# Patient Record
Sex: FEMALE | Race: WHITE | NOT HISPANIC OR LATINO | Employment: OTHER | URBAN - METROPOLITAN AREA
[De-identification: names, ages, dates, MRNs, and addresses within clinical notes are randomized per-mention and may not be internally consistent; named-entity substitution may affect disease eponyms.]

---

## 2022-06-24 ENCOUNTER — HOSPITAL ENCOUNTER (INPATIENT)
Facility: HOSPITAL | Age: 82
LOS: 1 days | Discharge: HOME | End: 2022-06-25
Attending: INTERNAL MEDICINE | Admitting: INTERNAL MEDICINE

## 2022-06-24 ENCOUNTER — APPOINTMENT (OUTPATIENT)
Dept: CARDIOLOGY | Facility: HOSPITAL | Age: 82
End: 2022-06-24

## 2022-06-24 DIAGNOSIS — I25.118 CORONARY ARTERY DISEASE OF NATIVE ARTERY OF NATIVE HEART WITH STABLE ANGINA PECTORIS: Primary | ICD-10-CM

## 2022-06-24 LAB
ANION GAP SERPL CALCULATED.3IONS-SCNC: 9 MMOL/L (ref 5–15)
BH CV ECHO MEAS - AO MAX PG: 7.6 MMHG
BH CV ECHO MEAS - AO MEAN PG: 4 MMHG
BH CV ECHO MEAS - AO ROOT DIAM: 3.1 CM
BH CV ECHO MEAS - AO V2 MAX: 138 CM/SEC
BH CV ECHO MEAS - AO V2 VTI: 26 CM
BH CV ECHO MEAS - AVA(I,D): 1.93 CM2
BH CV ECHO MEAS - EDV(CUBED): 54.9 ML
BH CV ECHO MEAS - EDV(MOD-SP2): 85.2 ML
BH CV ECHO MEAS - EDV(MOD-SP4): 105 ML
BH CV ECHO MEAS - EF(MOD-BP): 48.7 %
BH CV ECHO MEAS - EF(MOD-SP2): 39.6 %
BH CV ECHO MEAS - EF(MOD-SP4): 56.4 %
BH CV ECHO MEAS - ESV(CUBED): 15.6 ML
BH CV ECHO MEAS - ESV(MOD-SP2): 51.5 ML
BH CV ECHO MEAS - ESV(MOD-SP4): 45.8 ML
BH CV ECHO MEAS - FS: 34.2 %
BH CV ECHO MEAS - IVS/LVPW: 1 CM
BH CV ECHO MEAS - IVSD: 1.2 CM
BH CV ECHO MEAS - LA DIMENSION: 2.6 CM
BH CV ECHO MEAS - LAT PEAK E' VEL: 5.9 CM/SEC
BH CV ECHO MEAS - LV MASS(C)D: 153.2 GRAMS
BH CV ECHO MEAS - LV MAX PG: 3.9 MMHG
BH CV ECHO MEAS - LV MEAN PG: 2 MMHG
BH CV ECHO MEAS - LV V1 MAX: 98.2 CM/SEC
BH CV ECHO MEAS - LV V1 VTI: 17.7 CM
BH CV ECHO MEAS - LVIDD: 3.8 CM
BH CV ECHO MEAS - LVIDS: 2.5 CM
BH CV ECHO MEAS - LVOT AREA: 2.8 CM2
BH CV ECHO MEAS - LVOT DIAM: 1.9 CM
BH CV ECHO MEAS - LVPWD: 1.2 CM
BH CV ECHO MEAS - MV A MAX VEL: 85 CM/SEC
BH CV ECHO MEAS - MV DEC TIME: 0.22 MSEC
BH CV ECHO MEAS - MV E MAX VEL: 76.8 CM/SEC
BH CV ECHO MEAS - MV E/A: 0.9
BH CV ECHO MEAS - MV MAX PG: 3.9 MMHG
BH CV ECHO MEAS - MV MEAN PG: 2 MMHG
BH CV ECHO MEAS - MV V2 VTI: 25.8 CM
BH CV ECHO MEAS - MVA(VTI): 1.95 CM2
BH CV ECHO MEAS - RAP SYSTOLE: 3 MMHG
BH CV ECHO MEAS - RVSP: 27 MMHG
BH CV ECHO MEAS - SV(LVOT): 50.2 ML
BH CV ECHO MEAS - SV(MOD-SP2): 33.7 ML
BH CV ECHO MEAS - SV(MOD-SP4): 59.2 ML
BH CV ECHO MEAS - TR MAX PG: 24.3 MMHG
BH CV ECHO MEAS - TR MAX VEL: 246.5 CM/SEC
BH CV XLRA - RV BASE: 3.2 CM
BH CV XLRA - RV LENGTH: 5.5 CM
BH CV XLRA - RV MID: 3 CM
BH CV XLRA - TDI S': 13.9 CM/SEC
BUN SERPL-MCNC: 33 MG/DL (ref 8–23)
BUN/CREAT SERPL: 23.2 (ref 7–25)
CALCIUM SPEC-SCNC: 10.2 MG/DL (ref 8.6–10.5)
CHLORIDE SERPL-SCNC: 95 MMOL/L (ref 98–107)
CO2 SERPL-SCNC: 27 MMOL/L (ref 22–29)
CREAT SERPL-MCNC: 1.42 MG/DL (ref 0.57–1)
DEPRECATED RDW RBC AUTO: 61.4 FL (ref 37–54)
EGFRCR SERPLBLD CKD-EPI 2021: 37 ML/MIN/1.73
ERYTHROCYTE [DISTWIDTH] IN BLOOD BY AUTOMATED COUNT: 18.4 % (ref 12.3–15.4)
GLUCOSE SERPL-MCNC: 129 MG/DL (ref 65–99)
HCT VFR BLD AUTO: 37.8 % (ref 34–46.6)
HGB BLD-MCNC: 11.9 G/DL (ref 12–15.9)
MAXIMAL PREDICTED HEART RATE: 138 BPM
MCH RBC QN AUTO: 28.9 PG (ref 26.6–33)
MCHC RBC AUTO-ENTMCNC: 31.5 G/DL (ref 31.5–35.7)
MCV RBC AUTO: 91.7 FL (ref 79–97)
PLATELET # BLD AUTO: 319 10*3/MM3 (ref 140–450)
PMV BLD AUTO: 9.6 FL (ref 6–12)
POTASSIUM SERPL-SCNC: 4.5 MMOL/L (ref 3.5–5.2)
RBC # BLD AUTO: 4.12 10*6/MM3 (ref 3.77–5.28)
SARS-COV-2 RDRP RESP QL NAA+PROBE: NORMAL
SODIUM SERPL-SCNC: 131 MMOL/L (ref 136–145)
STRESS TARGET HR: 117 BPM
WBC NRBC COR # BLD: 9.85 10*3/MM3 (ref 3.4–10.8)

## 2022-06-24 PROCEDURE — C1769 GUIDE WIRE: HCPCS | Performed by: INTERNAL MEDICINE

## 2022-06-24 PROCEDURE — 93458 L HRT ARTERY/VENTRICLE ANGIO: CPT | Performed by: INTERNAL MEDICINE

## 2022-06-24 PROCEDURE — 25010000002 MIDAZOLAM PER 1 MG: Performed by: INTERNAL MEDICINE

## 2022-06-24 PROCEDURE — 93005 ELECTROCARDIOGRAM TRACING: CPT | Performed by: INTERNAL MEDICINE

## 2022-06-24 PROCEDURE — C1725 CATH, TRANSLUMIN NON-LASER: HCPCS | Performed by: INTERNAL MEDICINE

## 2022-06-24 PROCEDURE — 25010000002 HEPARIN (PORCINE) PER 1000 UNITS: Performed by: INTERNAL MEDICINE

## 2022-06-24 PROCEDURE — 93571 IV DOP VEL&/PRESS C FLO 1ST: CPT | Performed by: INTERNAL MEDICINE

## 2022-06-24 PROCEDURE — C1894 INTRO/SHEATH, NON-LASER: HCPCS | Performed by: INTERNAL MEDICINE

## 2022-06-24 PROCEDURE — 80048 BASIC METABOLIC PNL TOTAL CA: CPT | Performed by: INTERNAL MEDICINE

## 2022-06-24 PROCEDURE — B2111ZZ FLUOROSCOPY OF MULTIPLE CORONARY ARTERIES USING LOW OSMOLAR CONTRAST: ICD-10-PCS | Performed by: INTERNAL MEDICINE

## 2022-06-24 PROCEDURE — 93306 TTE W/DOPPLER COMPLETE: CPT

## 2022-06-24 PROCEDURE — C1887 CATHETER, GUIDING: HCPCS | Performed by: INTERNAL MEDICINE

## 2022-06-24 PROCEDURE — 4A023N7 MEASUREMENT OF CARDIAC SAMPLING AND PRESSURE, LEFT HEART, PERCUTANEOUS APPROACH: ICD-10-PCS | Performed by: INTERNAL MEDICINE

## 2022-06-24 PROCEDURE — 93306 TTE W/DOPPLER COMPLETE: CPT | Performed by: INTERNAL MEDICINE

## 2022-06-24 PROCEDURE — 87635 SARS-COV-2 COVID-19 AMP PRB: CPT | Performed by: NURSE PRACTITIONER

## 2022-06-24 PROCEDURE — 99223 1ST HOSP IP/OBS HIGH 75: CPT | Performed by: INTERNAL MEDICINE

## 2022-06-24 PROCEDURE — 0 IOPAMIDOL PER 1 ML: Performed by: INTERNAL MEDICINE

## 2022-06-24 PROCEDURE — 92920 PRQ TRLUML C ANGIOP 1ART&/BR: CPT | Performed by: INTERNAL MEDICINE

## 2022-06-24 PROCEDURE — B2151ZZ FLUOROSCOPY OF LEFT HEART USING LOW OSMOLAR CONTRAST: ICD-10-PCS | Performed by: INTERNAL MEDICINE

## 2022-06-24 PROCEDURE — 02703ZZ DILATION OF CORONARY ARTERY, ONE ARTERY, PERCUTANEOUS APPROACH: ICD-10-PCS | Performed by: INTERNAL MEDICINE

## 2022-06-24 PROCEDURE — 85347 COAGULATION TIME ACTIVATED: CPT

## 2022-06-24 PROCEDURE — 85027 COMPLETE CBC AUTOMATED: CPT | Performed by: INTERNAL MEDICINE

## 2022-06-24 PROCEDURE — 25010000002 FENTANYL CITRATE (PF) 50 MCG/ML SOLUTION: Performed by: INTERNAL MEDICINE

## 2022-06-24 PROCEDURE — 25010000002 PHENYLEPHRINE 10 MG/ML SOLUTION: Performed by: INTERNAL MEDICINE

## 2022-06-24 RX ORDER — SODIUM CHLORIDE 0.9 % (FLUSH) 0.9 %
3-10 SYRINGE (ML) INJECTION AS NEEDED
Status: DISCONTINUED | OUTPATIENT
Start: 2022-06-24 | End: 2022-06-24 | Stop reason: HOSPADM

## 2022-06-24 RX ORDER — ACETAMINOPHEN 325 MG/1
650 TABLET ORAL EVERY 6 HOURS PRN
Status: DISCONTINUED | OUTPATIENT
Start: 2022-06-24 | End: 2022-06-25 | Stop reason: HOSPADM

## 2022-06-24 RX ORDER — CLOPIDOGREL BISULFATE 75 MG/1
75 TABLET ORAL DAILY
Status: DISCONTINUED | OUTPATIENT
Start: 2022-06-25 | End: 2022-06-25 | Stop reason: HOSPADM

## 2022-06-24 RX ORDER — FUROSEMIDE 40 MG/1
40 TABLET ORAL 2 TIMES DAILY
COMMUNITY

## 2022-06-24 RX ORDER — CLOPIDOGREL BISULFATE 75 MG/1
TABLET ORAL AS NEEDED
Status: DISCONTINUED | OUTPATIENT
Start: 2022-06-24 | End: 2022-06-24 | Stop reason: HOSPADM

## 2022-06-24 RX ORDER — ASPIRIN 81 MG/1
81 TABLET ORAL DAILY
Status: DISCONTINUED | OUTPATIENT
Start: 2022-06-24 | End: 2022-06-24

## 2022-06-24 RX ORDER — AMIODARONE HYDROCHLORIDE 200 MG/1
200 TABLET ORAL 2 TIMES DAILY
Status: ON HOLD | COMMUNITY
End: 2022-06-25 | Stop reason: SDUPTHER

## 2022-06-24 RX ORDER — SOTALOL HYDROCHLORIDE 80 MG/1
80 TABLET ORAL DAILY
COMMUNITY
End: 2022-06-25 | Stop reason: HOSPADM

## 2022-06-24 RX ORDER — ACETAMINOPHEN 325 MG/1
650 TABLET ORAL EVERY 6 HOURS PRN
COMMUNITY
End: 2022-06-25 | Stop reason: HOSPADM

## 2022-06-24 RX ORDER — FAMOTIDINE 20 MG/1
20 TABLET, FILM COATED ORAL DAILY
COMMUNITY
End: 2022-06-25 | Stop reason: HOSPADM

## 2022-06-24 RX ORDER — CELECOXIB 200 MG/1
200 CAPSULE ORAL DAILY
COMMUNITY
End: 2022-06-25 | Stop reason: HOSPADM

## 2022-06-24 RX ORDER — SODIUM CHLORIDE 9 MG/ML
3 INJECTION, SOLUTION INTRAVENOUS CONTINUOUS
Status: DISCONTINUED | OUTPATIENT
Start: 2022-06-24 | End: 2022-06-24

## 2022-06-24 RX ORDER — LIDOCAINE HYDROCHLORIDE 10 MG/ML
INJECTION, SOLUTION EPIDURAL; INFILTRATION; INTRACAUDAL; PERINEURAL AS NEEDED
Status: DISCONTINUED | OUTPATIENT
Start: 2022-06-24 | End: 2022-06-24 | Stop reason: HOSPADM

## 2022-06-24 RX ORDER — ERGOCALCIFEROL 1.25 MG/1
50000 CAPSULE ORAL
COMMUNITY

## 2022-06-24 RX ORDER — OMEPRAZOLE 40 MG/1
40 CAPSULE, DELAYED RELEASE ORAL DAILY
COMMUNITY

## 2022-06-24 RX ORDER — DONEPEZIL HYDROCHLORIDE 10 MG/1
10 TABLET, FILM COATED ORAL NIGHTLY
COMMUNITY

## 2022-06-24 RX ORDER — FLUOXETINE HYDROCHLORIDE 20 MG/1
20 CAPSULE ORAL DAILY
Status: DISCONTINUED | OUTPATIENT
Start: 2022-06-24 | End: 2022-06-25 | Stop reason: HOSPADM

## 2022-06-24 RX ORDER — SODIUM CHLORIDE 9 MG/ML
1 INJECTION, SOLUTION INTRAVENOUS CONTINUOUS
Status: ACTIVE | OUTPATIENT
Start: 2022-06-24 | End: 2022-06-24

## 2022-06-24 RX ORDER — AMIODARONE HYDROCHLORIDE 200 MG/1
200 TABLET ORAL 2 TIMES DAILY
Status: DISCONTINUED | OUTPATIENT
Start: 2022-06-24 | End: 2022-06-25 | Stop reason: HOSPADM

## 2022-06-24 RX ORDER — FENTANYL CITRATE 50 UG/ML
INJECTION, SOLUTION INTRAMUSCULAR; INTRAVENOUS AS NEEDED
Status: DISCONTINUED | OUTPATIENT
Start: 2022-06-24 | End: 2022-06-24 | Stop reason: HOSPADM

## 2022-06-24 RX ORDER — LOSARTAN POTASSIUM 25 MG/1
25 TABLET ORAL 2 TIMES DAILY
COMMUNITY

## 2022-06-24 RX ORDER — DILTIAZEM HYDROCHLORIDE 180 MG/1
180 CAPSULE, COATED, EXTENDED RELEASE ORAL DAILY
Status: DISCONTINUED | OUTPATIENT
Start: 2022-06-25 | End: 2022-06-25 | Stop reason: HOSPADM

## 2022-06-24 RX ORDER — PHENYLEPHRINE HYDROCHLORIDE 10 MG/ML
INJECTION INTRAVENOUS AS NEEDED
Status: DISCONTINUED | OUTPATIENT
Start: 2022-06-24 | End: 2022-06-24 | Stop reason: HOSPADM

## 2022-06-24 RX ORDER — ALBUTEROL SULFATE 2.5 MG/3ML
2.5 SOLUTION RESPIRATORY (INHALATION) EVERY 4 HOURS PRN
COMMUNITY

## 2022-06-24 RX ORDER — DONEPEZIL HYDROCHLORIDE 10 MG/1
10 TABLET, FILM COATED ORAL NIGHTLY
Status: DISCONTINUED | OUTPATIENT
Start: 2022-06-24 | End: 2022-06-25 | Stop reason: HOSPADM

## 2022-06-24 RX ORDER — DOCUSATE SODIUM 100 MG/1
100 CAPSULE, LIQUID FILLED ORAL 2 TIMES DAILY
COMMUNITY

## 2022-06-24 RX ORDER — PANTOPRAZOLE SODIUM 40 MG/1
40 TABLET, DELAYED RELEASE ORAL DAILY
COMMUNITY
End: 2022-06-25 | Stop reason: HOSPADM

## 2022-06-24 RX ORDER — ISOSORBIDE MONONITRATE 60 MG/1
60 TABLET, EXTENDED RELEASE ORAL DAILY
COMMUNITY

## 2022-06-24 RX ORDER — FLUOXETINE HYDROCHLORIDE 20 MG/1
20 CAPSULE ORAL DAILY
COMMUNITY

## 2022-06-24 RX ORDER — ALBUTEROL SULFATE 2.5 MG/3ML
2.5 SOLUTION RESPIRATORY (INHALATION) EVERY 4 HOURS PRN
Status: DISCONTINUED | OUTPATIENT
Start: 2022-06-24 | End: 2022-06-25 | Stop reason: HOSPADM

## 2022-06-24 RX ORDER — MONTELUKAST SODIUM 10 MG/1
10 TABLET ORAL NIGHTLY
COMMUNITY

## 2022-06-24 RX ORDER — HEPARIN SODIUM 1000 [USP'U]/ML
INJECTION, SOLUTION INTRAVENOUS; SUBCUTANEOUS AS NEEDED
Status: DISCONTINUED | OUTPATIENT
Start: 2022-06-24 | End: 2022-06-24 | Stop reason: HOSPADM

## 2022-06-24 RX ORDER — POTASSIUM CHLORIDE 750 MG/1
20 TABLET, FILM COATED, EXTENDED RELEASE ORAL 2 TIMES DAILY WITH MEALS
COMMUNITY

## 2022-06-24 RX ORDER — ASPIRIN 81 MG/1
81 TABLET ORAL DAILY
COMMUNITY
End: 2022-06-25 | Stop reason: HOSPADM

## 2022-06-24 RX ORDER — DILTIAZEM HYDROCHLORIDE 180 MG/1
180 CAPSULE, COATED, EXTENDED RELEASE ORAL DAILY
COMMUNITY

## 2022-06-24 RX ORDER — ONDANSETRON 4 MG/1
4 TABLET, ORALLY DISINTEGRATING ORAL EVERY 6 HOURS PRN
COMMUNITY

## 2022-06-24 RX ORDER — SODIUM CHLORIDE 0.9 % (FLUSH) 0.9 %
10 SYRINGE (ML) INJECTION AS NEEDED
Status: DISCONTINUED | OUTPATIENT
Start: 2022-06-24 | End: 2022-06-25 | Stop reason: HOSPADM

## 2022-06-24 RX ORDER — SODIUM CHLORIDE 0.9 % (FLUSH) 0.9 %
10 SYRINGE (ML) INJECTION EVERY 12 HOURS SCHEDULED
Status: DISCONTINUED | OUTPATIENT
Start: 2022-06-24 | End: 2022-06-25 | Stop reason: HOSPADM

## 2022-06-24 RX ORDER — PREDNISONE 10 MG/1
10 TABLET ORAL DAILY
COMMUNITY

## 2022-06-24 RX ORDER — PRIMIDONE 50 MG/1
50 TABLET ORAL 2 TIMES DAILY
COMMUNITY

## 2022-06-24 RX ORDER — MONTELUKAST SODIUM 4 MG/1
1 TABLET, CHEWABLE ORAL 2 TIMES DAILY
COMMUNITY

## 2022-06-24 RX ORDER — ONDANSETRON 2 MG/ML
4 INJECTION INTRAMUSCULAR; INTRAVENOUS EVERY 6 HOURS PRN
Status: DISCONTINUED | OUTPATIENT
Start: 2022-06-24 | End: 2022-06-25 | Stop reason: HOSPADM

## 2022-06-24 RX ORDER — FAMOTIDINE 20 MG/1
20 TABLET, FILM COATED ORAL DAILY
Status: DISCONTINUED | OUTPATIENT
Start: 2022-06-24 | End: 2022-06-25 | Stop reason: HOSPADM

## 2022-06-24 RX ORDER — ONDANSETRON 4 MG/1
4 TABLET, FILM COATED ORAL EVERY 6 HOURS PRN
Status: DISCONTINUED | OUTPATIENT
Start: 2022-06-24 | End: 2022-06-25 | Stop reason: HOSPADM

## 2022-06-24 RX ORDER — LOSARTAN POTASSIUM 25 MG/1
25 TABLET ORAL 2 TIMES DAILY
Status: DISCONTINUED | OUTPATIENT
Start: 2022-06-24 | End: 2022-06-25 | Stop reason: HOSPADM

## 2022-06-24 RX ORDER — MIDAZOLAM HYDROCHLORIDE 1 MG/ML
INJECTION INTRAMUSCULAR; INTRAVENOUS AS NEEDED
Status: DISCONTINUED | OUTPATIENT
Start: 2022-06-24 | End: 2022-06-24 | Stop reason: HOSPADM

## 2022-06-24 RX ORDER — RALOXIFENE HYDROCHLORIDE 60 MG/1
60 TABLET, FILM COATED ORAL DAILY
COMMUNITY

## 2022-06-24 RX ORDER — SODIUM CHLORIDE 0.9 % (FLUSH) 0.9 %
3 SYRINGE (ML) INJECTION EVERY 12 HOURS SCHEDULED
Status: DISCONTINUED | OUTPATIENT
Start: 2022-06-24 | End: 2022-06-24 | Stop reason: HOSPADM

## 2022-06-24 RX ORDER — SUCRALFATE 1 G/1
1 TABLET ORAL 4 TIMES DAILY
COMMUNITY

## 2022-06-24 RX ORDER — FOLIC ACID 1 MG/1
1 TABLET ORAL DAILY
COMMUNITY

## 2022-06-24 RX ORDER — LANOLIN ALCOHOL/MO/W.PET/CERES
1 CREAM (GRAM) TOPICAL DAILY
Status: ON HOLD | COMMUNITY
End: 2022-06-24

## 2022-06-24 RX ADMIN — SODIUM CHLORIDE 3 ML/KG/HR: 9 INJECTION, SOLUTION INTRAVENOUS at 14:43

## 2022-06-24 RX ADMIN — LOSARTAN POTASSIUM 25 MG: 25 TABLET, FILM COATED ORAL at 21:05

## 2022-06-24 RX ADMIN — Medication 10 ML: at 21:05

## 2022-06-24 RX ADMIN — FAMOTIDINE 20 MG: 20 TABLET ORAL at 17:39

## 2022-06-24 RX ADMIN — AMIODARONE HYDROCHLORIDE 200 MG: 200 TABLET ORAL at 21:04

## 2022-06-24 RX ADMIN — DONEPEZIL HYDROCHLORIDE 10 MG: 10 TABLET ORAL at 21:05

## 2022-06-25 VITALS
DIASTOLIC BLOOD PRESSURE: 68 MMHG | TEMPERATURE: 98 F | SYSTOLIC BLOOD PRESSURE: 120 MMHG | RESPIRATION RATE: 16 BRPM | HEIGHT: 64 IN | OXYGEN SATURATION: 94 % | BODY MASS INDEX: 27.31 KG/M2 | WEIGHT: 160 LBS | HEART RATE: 70 BPM

## 2022-06-25 LAB
ANION GAP SERPL CALCULATED.3IONS-SCNC: 9 MMOL/L (ref 5–15)
BUN SERPL-MCNC: 34 MG/DL (ref 8–23)
BUN/CREAT SERPL: 25.8 (ref 7–25)
CALCIUM SPEC-SCNC: 9.7 MG/DL (ref 8.6–10.5)
CHLORIDE SERPL-SCNC: 100 MMOL/L (ref 98–107)
CHOLEST SERPL-MCNC: 161 MG/DL (ref 0–200)
CO2 SERPL-SCNC: 26 MMOL/L (ref 22–29)
CREAT SERPL-MCNC: 1.32 MG/DL (ref 0.57–1)
DEPRECATED RDW RBC AUTO: 63.5 FL (ref 37–54)
EGFRCR SERPLBLD CKD-EPI 2021: 40.4 ML/MIN/1.73
ERYTHROCYTE [DISTWIDTH] IN BLOOD BY AUTOMATED COUNT: 18.5 % (ref 12.3–15.4)
GLUCOSE SERPL-MCNC: 108 MG/DL (ref 65–99)
HCT VFR BLD AUTO: 36.5 % (ref 34–46.6)
HDLC SERPL-MCNC: 58 MG/DL (ref 40–60)
HGB BLD-MCNC: 11.5 G/DL (ref 12–15.9)
LDLC SERPL CALC-MCNC: 77 MG/DL (ref 0–100)
LDLC/HDLC SERPL: 1.24 {RATIO}
MCH RBC QN AUTO: 29.3 PG (ref 26.6–33)
MCHC RBC AUTO-ENTMCNC: 31.5 G/DL (ref 31.5–35.7)
MCV RBC AUTO: 92.9 FL (ref 79–97)
PLATELET # BLD AUTO: 300 10*3/MM3 (ref 140–450)
PMV BLD AUTO: 9.6 FL (ref 6–12)
POTASSIUM SERPL-SCNC: 4.3 MMOL/L (ref 3.5–5.2)
QT INTERVAL: 406 MS
QTC INTERVAL: 438 MS
RBC # BLD AUTO: 3.93 10*6/MM3 (ref 3.77–5.28)
SODIUM SERPL-SCNC: 135 MMOL/L (ref 136–145)
TRIGL SERPL-MCNC: 154 MG/DL (ref 0–150)
VLDLC SERPL-MCNC: 26 MG/DL (ref 5–40)
WBC NRBC COR # BLD: 7.82 10*3/MM3 (ref 3.4–10.8)

## 2022-06-25 PROCEDURE — 99239 HOSP IP/OBS DSCHRG MGMT >30: CPT | Performed by: INTERNAL MEDICINE

## 2022-06-25 PROCEDURE — 80048 BASIC METABOLIC PNL TOTAL CA: CPT | Performed by: INTERNAL MEDICINE

## 2022-06-25 PROCEDURE — 80061 LIPID PANEL: CPT | Performed by: INTERNAL MEDICINE

## 2022-06-25 PROCEDURE — 85027 COMPLETE CBC AUTOMATED: CPT | Performed by: FAMILY MEDICINE

## 2022-06-25 RX ORDER — AMIODARONE HYDROCHLORIDE 200 MG/1
200 TABLET ORAL DAILY
Start: 2022-06-25

## 2022-06-25 RX ORDER — CLOPIDOGREL BISULFATE 75 MG/1
75 TABLET ORAL DAILY
Qty: 90 TABLET | Refills: 1 | Status: SHIPPED | OUTPATIENT
Start: 2022-06-26

## 2022-06-25 RX ADMIN — CLOPIDOGREL BISULFATE 75 MG: 75 TABLET ORAL at 10:34

## 2022-06-25 RX ADMIN — FAMOTIDINE 20 MG: 20 TABLET ORAL at 10:34

## 2022-06-25 RX ADMIN — DILTIAZEM HYDROCHLORIDE 180 MG: 180 CAPSULE, COATED, EXTENDED RELEASE ORAL at 10:34

## 2022-06-25 RX ADMIN — Medication 10 ML: at 10:36

## 2022-06-25 RX ADMIN — AMIODARONE HYDROCHLORIDE 200 MG: 200 TABLET ORAL at 10:34

## 2022-06-25 RX ADMIN — FLUOXETINE 20 MG: 20 CAPSULE ORAL at 10:35

## 2022-06-25 RX ADMIN — LOSARTAN POTASSIUM 25 MG: 25 TABLET, FILM COATED ORAL at 10:35

## 2022-06-27 ENCOUNTER — DOCUMENTATION (OUTPATIENT)
Dept: CARDIAC REHAB | Facility: HOSPITAL | Age: 82
End: 2022-06-27

## 2022-06-27 LAB — ACT BLD: 277 SECONDS (ref 82–152)

## 2022-06-28 ENCOUNTER — DOCUMENTATION (OUTPATIENT)
Dept: CARDIAC REHAB | Facility: HOSPITAL | Age: 82
End: 2022-06-28

## 2022-06-28 NOTE — PROGRESS NOTES
Cardiac Rehab staff mailed referral letter to patient regarding Phase II Cardiac Rehab program. Instruction for patient to contact [facility] Cardiac Rehab Department for additional program information and to forward referral to closest facility.

## (undated) DEVICE — MODEL BT2000 P/N 700287-012KIT CONTENTS: MANIFOLD WITH SALINE AND CONTRAST PORTS, SALINE TUBING WITH SPIKE AND HAND SYRINGE, TRANSDUCER: Brand: BT2000 AUTOMATED MANIFOLD KIT

## (undated) DEVICE — CATH DIAG EXPO M/ PK 5F FL4/FR4 PIG

## (undated) DEVICE — CATH DIAG EXPO .045 FL3.5 5F 100CM

## (undated) DEVICE — GLIDESHEATH SLENDER STAINLESS STEEL KIT: Brand: GLIDESHEATH SLENDER

## (undated) DEVICE — NC TREK CORONARY DILATATION CATHETER 2.5 MM X 12 MM / RAPID-EXCHANGE: Brand: NC TREK

## (undated) DEVICE — GUIDE CATHETER: Brand: MACH1™

## (undated) DEVICE — Device: Brand: OMNIWIRE PRESSURE GUIDE WIRE

## (undated) DEVICE — GW PERIPH GUIDERIGHT STD/EXCHNG/J/TIP SS 0.035IN 5X260CM

## (undated) DEVICE — MODEL AT P65, P/N 701554-001KIT CONTENTS: HAND CONTROLLER, 3-WAY HIGH-PRESSURE STOPCOCK WITH ROTATING END AND PREMIUM HIGH-PRESSURE TUBING: Brand: ANGIOTOUCH® KIT

## (undated) DEVICE — DEV COMP RAD PRELUDESYNC 24CM

## (undated) DEVICE — DEV INFL MONARCH 25W

## (undated) DEVICE — PK CATH CARD 10

## (undated) DEVICE — KT VLV HEMO MAP ACC PLS LG/BORE MTL/INTRO W/TORQ/DEV